# Patient Record
Sex: MALE
[De-identification: names, ages, dates, MRNs, and addresses within clinical notes are randomized per-mention and may not be internally consistent; named-entity substitution may affect disease eponyms.]

---

## 2020-08-24 ENCOUNTER — APPOINTMENT (OUTPATIENT)
Dept: HEART AND VASCULAR | Facility: CLINIC | Age: 50
End: 2020-08-24
Payer: COMMERCIAL

## 2020-08-24 VITALS — SYSTOLIC BLOOD PRESSURE: 132 MMHG | DIASTOLIC BLOOD PRESSURE: 94 MMHG | HEART RATE: 74 BPM

## 2020-08-24 DIAGNOSIS — I10 ESSENTIAL (PRIMARY) HYPERTENSION: ICD-10-CM

## 2020-08-24 PROBLEM — Z00.00 ENCOUNTER FOR PREVENTIVE HEALTH EXAMINATION: Status: ACTIVE | Noted: 2020-08-24

## 2020-08-24 PROCEDURE — 99203 OFFICE O/P NEW LOW 30 MIN: CPT

## 2020-08-24 RX ORDER — LISINOPRIL 10 MG/1
10 TABLET ORAL DAILY
Qty: 30 | Refills: 3 | Status: ACTIVE | COMMUNITY
Start: 2020-08-24 | End: 1900-01-01

## 2020-08-24 NOTE — PHYSICAL EXAM
[Normal Oral Mucosa] : normal oral mucosa [No Oral Pallor] : no oral pallor [No Oral Cyanosis] : no oral cyanosis [Normal Jugular Venous A Waves Present] : normal jugular venous A waves present [Normal Jugular Venous V Waves Present] : normal jugular venous V waves present [No Jugular Venous Jeter A Waves] : no jugular venous jeter A waves [Heart Rate And Rhythm] : heart rate and rhythm were normal [Murmurs] : no murmurs present [Heart Sounds] : normal S1 and S2 [Exaggerated Use Of Accessory Muscles For Inspiration] : no accessory muscle use [Respiration, Rhythm And Depth] : normal respiratory rhythm and effort [Abdomen Soft] : soft [Auscultation Breath Sounds / Voice Sounds] : lungs were clear to auscultation bilaterally [Abdomen Tenderness] : non-tender [Abdomen Mass (___ Cm)] : no abdominal mass palpated [Nail Clubbing] : no clubbing of the fingernails [Cyanosis, Localized] : no localized cyanosis [Petechial Hemorrhages (___cm)] : no petechial hemorrhages [] : no ischemic changes

## 2020-08-24 NOTE — HISTORY OF PRESENT ILLNESS
[FreeTextEntry1] : 48 yo M with PMH HTN here for first evaluation of HTN\par Denies chest pain, shortness of breath, palpitations, syncope, presyncope, LE edema, orthopnea.\par Does not exercise daily, drives most of the time but denies any exertional angina. \par  \par Reports that he was on losartan in the last few years, self discontinued  last year and was recently restarted on it 3 weeks ago by his PMD\par Reports episodes of dizziness when standing up but also reports BP at home is uncontrolled with BP readings consistently in 160s systolic\par Recent labs reviewed and HgA1c 6.4 noted\par \par \par PMH\par HTN\par \par PSH\par cholecystectomy\par \par ALL\par NKDA\par \par MEDS\par losartan 25 mg daily -> switched to lisinopril 10 mg daily \par \par FH\par father with CABG\par \par SH\par no smoke, social etoh, no drugs\par used to be a \par \par LABS: \par cholesterol 187\par HLD 48\par triglycerides 177\par \par creatinine 1.05\par VtM7u=7.4\par \par EKG 8/10/2020\par SR, wnl

## 2020-08-24 NOTE — ASSESSMENT
[FreeTextEntry1] : 48 yo M with PMH HTN here for first evaluation of HTN\par \par HTN\par -explained the importance of ACE or ARB in the setting of HTN and DM\par -will try to switch from losartan to lisinopril\par -stop losartan\par -start lisinopril 10 mg daily\par -keep BP log at home\par -echo\par \par HLD\par -lipid panel reviewed\par -healthy diet and exercise recommended\par \par f/u in 2 weeks for BP check and echo results